# Patient Record
Sex: FEMALE | Race: BLACK OR AFRICAN AMERICAN | NOT HISPANIC OR LATINO
[De-identification: names, ages, dates, MRNs, and addresses within clinical notes are randomized per-mention and may not be internally consistent; named-entity substitution may affect disease eponyms.]

---

## 2022-04-09 ENCOUNTER — TRANSCRIPTION ENCOUNTER (OUTPATIENT)
Age: 72
End: 2022-04-09

## 2022-06-21 ENCOUNTER — OUTPATIENT (OUTPATIENT)
Dept: OUTPATIENT SERVICES | Facility: HOSPITAL | Age: 72
LOS: 1 days | End: 2022-06-21
Payer: COMMERCIAL

## 2022-06-21 DIAGNOSIS — R06.00 DYSPNEA, UNSPECIFIED: ICD-10-CM

## 2022-06-21 PROCEDURE — 93017 CV STRESS TEST TRACING ONLY: CPT

## 2022-06-21 PROCEDURE — 78452 HT MUSCLE IMAGE SPECT MULT: CPT | Mod: MH

## 2022-06-21 PROCEDURE — A9502: CPT

## 2022-12-12 PROBLEM — Z00.00 ENCOUNTER FOR PREVENTIVE HEALTH EXAMINATION: Status: ACTIVE | Noted: 2022-12-12

## 2022-12-13 ENCOUNTER — NON-APPOINTMENT (OUTPATIENT)
Age: 72
End: 2022-12-13

## 2022-12-13 ENCOUNTER — APPOINTMENT (OUTPATIENT)
Age: 72
End: 2022-12-13
Payer: COMMERCIAL

## 2022-12-13 ENCOUNTER — LABORATORY RESULT (OUTPATIENT)
Age: 72
End: 2022-12-13

## 2022-12-13 VITALS
SYSTOLIC BLOOD PRESSURE: 188 MMHG | BODY MASS INDEX: 38.8 KG/M2 | WEIGHT: 219 LBS | DIASTOLIC BLOOD PRESSURE: 91 MMHG | HEIGHT: 63 IN | HEART RATE: 84 BPM | TEMPERATURE: 98 F

## 2022-12-13 DIAGNOSIS — E66.9 OBESITY, UNSPECIFIED: ICD-10-CM

## 2022-12-13 DIAGNOSIS — Z87.891 PERSONAL HISTORY OF NICOTINE DEPENDENCE: ICD-10-CM

## 2022-12-13 DIAGNOSIS — Z86.16 PERSONAL HISTORY OF COVID-19: ICD-10-CM

## 2022-12-13 PROCEDURE — 36415 COLL VENOUS BLD VENIPUNCTURE: CPT

## 2022-12-13 PROCEDURE — 99245 OFF/OP CONSLTJ NEW/EST HI 55: CPT | Mod: 25

## 2022-12-13 PROCEDURE — 94060 EVALUATION OF WHEEZING: CPT

## 2022-12-13 PROCEDURE — 94664 DEMO&/EVAL PT USE INHALER: CPT | Mod: 59

## 2022-12-13 PROCEDURE — ZZZZZ: CPT

## 2022-12-13 PROCEDURE — 94726 PLETHYSMOGRAPHY LUNG VOLUMES: CPT

## 2022-12-13 PROCEDURE — 94729 DIFFUSING CAPACITY: CPT

## 2022-12-13 PROCEDURE — 96372 THER/PROPH/DIAG INJ SC/IM: CPT | Mod: 59

## 2022-12-13 RX ORDER — METOPROLOL SUCCINATE 100 MG/1
100 TABLET, EXTENDED RELEASE ORAL
Refills: 0 | Status: ACTIVE | COMMUNITY

## 2022-12-13 RX ORDER — GLYBURIDE 5 MG/1
5 TABLET ORAL
Refills: 0 | Status: ACTIVE | COMMUNITY

## 2022-12-13 RX ORDER — ALBUTEROL 90 MCG
90 AEROSOL (GRAM) INHALATION
Refills: 0 | Status: ACTIVE | COMMUNITY

## 2022-12-13 RX ORDER — METHYLPREDNISOLONE 40 MG/ML
40 INJECTION, POWDER, LYOPHILIZED, FOR SOLUTION INTRAMUSCULAR; INTRAVENOUS
Qty: 1 | Refills: 0 | Status: COMPLETED | OUTPATIENT
Start: 2022-12-13

## 2022-12-13 NOTE — REASON FOR VISIT
[Initial] : an initial visit [Shortness of Breath] : shortness of breath [Pulmonary Hypertension] : pulmonary hypertension

## 2022-12-13 NOTE — DISCUSSION/SUMMARY
Plan to continue IV lasix for next 24 to 48 hours.  Cardiac echo today to assess LV function.   [FreeTextEntry1] : ---Assessment plan----------The patient has been referred here for further opinion regarding pulmonary problem,\par 73yo with HTN, asthma since childhood, DM, CKD , obesity, pulm htn on echo w/ c/o RODRÍGUEZ\par \par 1) echo suggest pulm htn-----[  APPEARS TO DIASTOLIC DYSFUNCTION ]   referred to Dr Pepper Breaux for cadiac cath\par 2) Asthma, former smoker ?copd- will check CT chest noncontrast- start trelegy- instructed on proper use, albuterol rescue inhaler. s/p medrol 20mg IM in office- start low dose predniosne w/taper- advised to monitor blood sugars\par 3) labs drawn in our office today\par 4) CKD- follows nephrology Dr Barros\par 5) features of doug- snoring, fatigue, ooropharyngeal crowding ESS 3- get HST\par 6) labs drawn in our office today\par 7) UTD w/covid vac, refuses influenza vac\par 8) DM- on meds, wt loss, endo followup\par \par Thanks for allowing  me to participate  in the care of this patient.  Patient at this time  will follow  the above mentioned recommendations and return back for follow up visit. If you have any questions  I can be reached  at # [phone] (office).\par \par \par Kamryn Castellanos MD, FCCP \par Director, Pulmonary Hypertension Program \par [facility] \par Division of Pulmonary, Critical Care and Sleep Medicine \par  Professor of Medicine \par [employer]\par \par \par Jinny Calderón, ANP-C\par

## 2022-12-13 NOTE — HISTORY OF PRESENT ILLNESS
[Snoring] : snoring [TextBox_4] : This letter  is regarding your patient  who  attended pulmonary out patient office today.  I have reviewed  patient's  past history, social history, family history and medication list. I also  reviewed nurse practitioners/ and fellows  notes and assessment and agree with it.  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 Questionable history of Raynaud's phenomenon. No h/o significant weight loss in last few months. No history of liver dysfunction , collagen vascular disorder or chronic thromboembolic disease. I would classify the patient's dyspnea as WHO  FUNCTIONAL CLASS II--------\par \par ----Echo  date----2022 est pasp 64-72mmHG--\par ----Pft date------12/2022 normal lung volumes, decreased dlco---\par ----Ct scan date-\par --- cardiomegaly, bilat interstitial markening------\par ----Cath date------------\par \par  [ESS] : 3

## 2022-12-14 ENCOUNTER — LABORATORY RESULT (OUTPATIENT)
Age: 72
End: 2022-12-14

## 2022-12-14 ENCOUNTER — NON-APPOINTMENT (OUTPATIENT)
Age: 72
End: 2022-12-14

## 2022-12-14 DIAGNOSIS — N18.9 CHRONIC KIDNEY DISEASE, UNSPECIFIED: ICD-10-CM

## 2022-12-14 DIAGNOSIS — R06.02 SHORTNESS OF BREATH: ICD-10-CM

## 2022-12-14 LAB
A1AT SERPL-MCNC: 189 MG/DL
ALBUMIN SERPL ELPH-MCNC: 4.2 G/DL
ALP BLD-CCNC: 87 U/L
ALT SERPL-CCNC: 17 U/L
ANION GAP SERPL CALC-SCNC: 13 MMOL/L
APTT BLD: 31.6 SEC
AST SERPL-CCNC: 19 U/L
BASOPHILS # BLD AUTO: 0.01 K/UL
BASOPHILS NFR BLD AUTO: 0.2 %
BILIRUB SERPL-MCNC: 0.3 MG/DL
BUN SERPL-MCNC: 19 MG/DL
CALCIUM SERPL-MCNC: 9.1 MG/DL
CALCIUM SERPL-MCNC: 9.1 MG/DL
CHLORIDE SERPL-SCNC: 110 MMOL/L
CO2 SERPL-SCNC: 20 MMOL/L
COVID-19 NUCLEOCAPSID  GAM ANTIBODY INTERPRETATION: POSITIVE
COVID-19 SPIKE DOMAIN ANTIBODY INTERPRETATION: POSITIVE
CREAT SERPL-MCNC: 2.11 MG/DL
CRP SERPL-MCNC: 10 MG/L
DEPRECATED KAPPA LC FREE/LAMBDA SER: 2.55 RATIO
EGFR: 24 ML/MIN/1.73M2
ENA SCL70 IGG SER IA-ACNC: 0.3 AL
ENA SS-A AB SER IA-ACNC: >8 AL
ENA SS-B AB SER IA-ACNC: <0.2 AL
EOSINOPHIL # BLD AUTO: 0.12 K/UL
EOSINOPHIL NFR BLD AUTO: 2.6 %
ERYTHROCYTE [SEDIMENTATION RATE] IN BLOOD BY WESTERGREN METHOD: 100 MM/HR
ESTIMATED AVERAGE GLUCOSE: 143 MG/DL
FERRITIN SERPL-MCNC: 52 NG/ML
FOLATE RBC-MCNC: 981 NG/ML
GLUCOSE SERPL-MCNC: 58 MG/DL
HBA1C MFR BLD HPLC: 6.6 %
HCT VFR BLD CALC: 31.7 %
HCT VFR BLD CALC: 31.7 %
HCYS SERPL-MCNC: 15.3 UMOL/L
HGB BLD-MCNC: 10 G/DL
IGA SER QL IEP: 344 MG/DL
IGG SER QL IEP: 1855 MG/DL
IGM SER QL IEP: 37 MG/DL
IMM GRANULOCYTES NFR BLD AUTO: 0.2 %
INR PPP: 1.09 RATIO
KAPPA LC CSF-MCNC: 4.46 MG/DL
KAPPA LC SERPL-MCNC: 11.37 MG/DL
LYMPHOCYTES # BLD AUTO: 1.05 K/UL
LYMPHOCYTES NFR BLD AUTO: 22.6 %
MAN DIFF?: NORMAL
MCHC RBC-ENTMCNC: 27.7 PG
MCHC RBC-ENTMCNC: 31.5 GM/DL
MCV RBC AUTO: 87.8 FL
MONOCYTES # BLD AUTO: 0.27 K/UL
MONOCYTES NFR BLD AUTO: 5.8 %
NEUTROPHILS # BLD AUTO: 3.18 K/UL
NEUTROPHILS NFR BLD AUTO: 68.6 %
NT-PROBNP SERPL-MCNC: 2263 PG/ML
PARATHYROID HORMONE INTACT: 283 PG/ML
PHOSPHATE SERPL-MCNC: 3.8 MG/DL
PLATELET # BLD AUTO: 212 K/UL
POTASSIUM SERPL-SCNC: 3.9 MMOL/L
PROT SERPL-MCNC: 7.5 G/DL
PT BLD: 12.6 SEC
RBC # BLD: 3.61 M/UL
RBC # FLD: 16.7 %
RHEUMATOID FACT SER QL: <10 IU/ML
SARS-COV-2 AB SERPL IA-ACNC: >250 U/ML
SARS-COV-2 AB SERPL QL IA: 15.3 INDEX
SODIUM SERPL-SCNC: 144 MMOL/L
TSH SERPL-ACNC: 1.19 UIU/ML
URATE SERPL-MCNC: 5.3 MG/DL
VIT B12 SERPL-MCNC: 615 PG/ML
WBC # FLD AUTO: 4.64 K/UL

## 2022-12-15 LAB
ANA PAT FLD IF-IMP: ABNORMAL
ANACR T: ABNORMAL
CARDIOLIPIN AB SER IA-ACNC: NEGATIVE
TOTAL IGE SMQN RAST: 170 KU/L

## 2022-12-16 LAB
M TB IFN-G BLD-IMP: ABNORMAL
QUANTIFERON TB PLUS MITOGEN MINUS NIL: 0.15 IU/ML
QUANTIFERON TB PLUS NIL: 0.01 IU/ML
QUANTIFERON TB PLUS TB1 MINUS NIL: 0 IU/ML
QUANTIFERON TB PLUS TB2 MINUS NIL: 0 IU/ML

## 2022-12-17 ENCOUNTER — NON-APPOINTMENT (OUTPATIENT)
Age: 72
End: 2022-12-17

## 2022-12-19 LAB
ALBUMIN MFR SERPL ELPH: 51.9 %
ALBUMIN SERPL-MCNC: 3.9 G/DL
ALBUMIN/GLOB SERPL: 1.1 RATIO
ALPHA1 GLOB MFR SERPL ELPH: 5.2 %
ALPHA1 GLOB SERPL ELPH-MCNC: 0.4 G/DL
ALPHA2 GLOB MFR SERPL ELPH: 9.8 %
ALPHA2 GLOB SERPL ELPH-MCNC: 0.7 G/DL
B-GLOBULIN MFR SERPL ELPH: 11.3 %
B-GLOBULIN SERPL ELPH-MCNC: 0.9 G/DL
CCP AB SER IA-ACNC: <8 UNITS
GAMMA GLOB FLD ELPH-MCNC: 1.7 G/DL
GAMMA GLOB MFR SERPL ELPH: 21.8 %
INTERPRETATION SERPL IEP-IMP: NORMAL
PROT SERPL-MCNC: 7.6 G/DL
PROT SERPL-MCNC: 7.6 G/DL
RF+CCP IGG SER-IMP: NEGATIVE

## 2023-02-03 ENCOUNTER — APPOINTMENT (OUTPATIENT)
Dept: PULMONOLOGY | Facility: CLINIC | Age: 73
End: 2023-02-03

## 2023-02-28 ENCOUNTER — RX RENEWAL (OUTPATIENT)
Age: 73
End: 2023-02-28

## 2023-05-11 ENCOUNTER — NON-APPOINTMENT (OUTPATIENT)
Age: 73
End: 2023-05-11

## 2023-06-02 ENCOUNTER — APPOINTMENT (OUTPATIENT)
Dept: PULMONOLOGY | Facility: CLINIC | Age: 73
End: 2023-06-02
Payer: COMMERCIAL

## 2023-06-02 VITALS
SYSTOLIC BLOOD PRESSURE: 187 MMHG | RESPIRATION RATE: 16 BRPM | OXYGEN SATURATION: 92 % | WEIGHT: 227 LBS | BODY MASS INDEX: 40.22 KG/M2 | TEMPERATURE: 97.7 F | HEIGHT: 63 IN | DIASTOLIC BLOOD PRESSURE: 74 MMHG | HEART RATE: 87 BPM

## 2023-06-02 DIAGNOSIS — I27.20 PULMONARY HYPERTENSION, UNSPECIFIED: ICD-10-CM

## 2023-06-02 PROCEDURE — 94618 PULMONARY STRESS TESTING: CPT

## 2023-06-02 PROCEDURE — 99215 OFFICE O/P EST HI 40 MIN: CPT | Mod: 25

## 2023-06-02 PROCEDURE — ZZZZZ: CPT

## 2023-06-02 RX ORDER — PREDNISONE 5 MG/1
5 TABLET ORAL
Qty: 45 | Refills: 0 | Status: DISCONTINUED | COMMUNITY
Start: 2022-12-13 | End: 2023-06-02

## 2023-06-02 NOTE — DISCUSSION/SUMMARY
[FreeTextEntry1] : ---Assessment plan----------The patient has been referred here for further opinion regarding pulmonary problem,\par 73yo with HTN, asthma since childhood, DM, CKD , obesity, SECONDARY  pulm htn on echo w/ c/o RODRÍGUEZ  -\par \par \par 1) echo suggest pulm htn-----[  APPEARS TO DIASTOLIC DYSFUNCTION ]   follows cards Dr Barlow-referred to Dr Pepper Breaux for cadiac cath----she needs right heart cath to better define the severity and etiology of her pulmonary hypertension\par 2) Asthma, former smoker ?copd- --------will check CT chest noncontrast- continue  trelegy-and , albuterol rescue inhaler. \par 3) CKD- follows nephrology Dr Barros\par 4) features of doug- snoring, fatigue, oropharyngeal crowding ESS 3- get HST\par 5) DM- on meds, wt loss, endo followup\par 6) 6mwt --2023----evidence of desaturation------ but not amenable to supplemental oxygen---\par 7) f/u in 3m\par Thanks for allowing  me to participate  in the care of this patient.  Patient at this time  will follow  the above mentioned recommendations and return back for follow up visit. If you have any questions  I can be reached  at # [phone] (office).\par \par \par Kamryn Castellanos MD, [facility] \par \par

## 2023-06-02 NOTE — END OF VISIT
[Time Spent: ___ minutes] : I have spent [unfilled] minutes of time on the encounter. [FreeTextEntry3] : \par  I, Dr. Kamryn Castellanos  personally performed the evaluation and management (E/M) services for this established patient who presents today with (a) new problem(s)/exacerbation of (an) existing condition(s). That E/M includes conducting the clinically appropriate interval history &/or exam, assessing all new/exacerbated conditions, and establishing a new plan of care. Today, my RALF, Jinny Calderón NP, , was here to observe my evaluation and management service for this new problem/exacerbated condition and follow the plan of care established by me going forward.\par

## 2023-06-02 NOTE — HISTORY OF PRESENT ILLNESS
[Former] : former [Snoring] : snoring [TextBox_4] : This letter  is regarding your patient  who  attended pulmonary out patient office today.  I have reviewed  patient's  past history, social history, family history and medication list. I also  reviewed nurse practitioners/ and fellows  notes and assessment and agree with it.  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 Questionable history of Raynaud's phenomenon. No h/o significant weight loss in last few months. No history of liver dysfunction , collagen vascular disorder or chronic thromboembolic disease. I would classify the patient's dyspnea as WHO  FUNCTIONAL CLASS II--------\par \par ----Echo  date----2022 est pasp 64-72mmHG--\par ----Pft date------12/2022 normal lung volumes, decreased dlco---\par ----Ct scan date-\par --- cardiomegaly, bilat interstitial markening------\par ----Cath date------------\par \par 6/2023 reports noted improvement in breathing since starting trelegy\par did not tolerate prednisone- stopped d/t elevated blood sugars\par has not had ordered CT or sleep study done--- awaiting to do 6-minute walk test and exercise oximetry [ESS] : 3

## 2023-07-05 ENCOUNTER — APPOINTMENT (OUTPATIENT)
Dept: SLEEP CENTER | Facility: CLINIC | Age: 73
End: 2023-07-05
Payer: COMMERCIAL

## 2023-07-05 ENCOUNTER — NON-APPOINTMENT (OUTPATIENT)
Age: 73
End: 2023-07-05

## 2023-07-05 ENCOUNTER — APPOINTMENT (OUTPATIENT)
Dept: CARDIOLOGY | Facility: CLINIC | Age: 73
End: 2023-07-05
Payer: COMMERCIAL

## 2023-07-05 ENCOUNTER — OUTPATIENT (OUTPATIENT)
Dept: OUTPATIENT SERVICES | Facility: HOSPITAL | Age: 73
LOS: 1 days | End: 2023-07-05
Payer: COMMERCIAL

## 2023-07-05 VITALS
SYSTOLIC BLOOD PRESSURE: 170 MMHG | WEIGHT: 227 LBS | HEART RATE: 81 BPM | BODY MASS INDEX: 40.22 KG/M2 | DIASTOLIC BLOOD PRESSURE: 71 MMHG | HEIGHT: 63 IN | OXYGEN SATURATION: 94 %

## 2023-07-05 PROCEDURE — 99204 OFFICE O/P NEW MOD 45 MIN: CPT | Mod: 25

## 2023-07-05 PROCEDURE — 93000 ELECTROCARDIOGRAM COMPLETE: CPT

## 2023-07-05 PROCEDURE — 95800 SLP STDY UNATTENDED: CPT

## 2023-07-05 PROCEDURE — 95800 SLP STDY UNATTENDED: CPT | Mod: 26

## 2023-07-05 NOTE — PHYSICAL EXAM
[Well Developed] : well developed [Normal Conjunctiva] : normal conjunctiva [Clear Lung Fields] : clear lung fields [de-identified] : 2 out of 6 systolic murmur at the left sternal border

## 2023-07-05 NOTE — HISTORY OF PRESENT ILLNESS
[FreeTextEntry1] : 72-year-old female history of hypertension, dyspnea, pulmonary hypertension.  Patient is seen pulmonary hypertension specialist and is here for evaluation to have a cardiac cath done to determine severity and etiology of her pulmonary hypertension.

## 2023-07-05 NOTE — REVIEW OF SYSTEMS
[Fever] : no fever [Blurry Vision] : no blurred vision [SOB] : shortness of breath [Dyspnea on exertion] : dyspnea during exertion [Cough] : no cough

## 2023-07-05 NOTE — DISCUSSION/SUMMARY
[FreeTextEntry1] : Pulmonary hypertension JOAQUIM was explained the details of the procedure, indications, risk and benefits . Patient agrees to the procedure with clear understanding of the information provided.\par \par Hypertension, blood pressure is elevated, patient is on her first visit.  I recommend that we follow her blood pressure trends and eventually feels need escalation of therapy depending on the blood pressure trends. [EKG obtained to assist in diagnosis and management of assessed problem(s)] : EKG obtained to assist in diagnosis and management of assessed problem(s)

## 2023-07-12 DIAGNOSIS — G47.33 OBSTRUCTIVE SLEEP APNEA (ADULT) (PEDIATRIC): ICD-10-CM

## 2023-07-13 ENCOUNTER — APPOINTMENT (OUTPATIENT)
Dept: PULMONOLOGY | Facility: CLINIC | Age: 73
End: 2023-07-13
Payer: COMMERCIAL

## 2023-07-13 VITALS
OXYGEN SATURATION: 92 % | HEIGHT: 63 IN | TEMPERATURE: 97.9 F | SYSTOLIC BLOOD PRESSURE: 201 MMHG | RESPIRATION RATE: 16 BRPM | BODY MASS INDEX: 39.69 KG/M2 | HEART RATE: 78 BPM | WEIGHT: 224 LBS | DIASTOLIC BLOOD PRESSURE: 98 MMHG

## 2023-07-13 PROCEDURE — 99215 OFFICE O/P EST HI 40 MIN: CPT

## 2023-07-13 NOTE — DISCUSSION/SUMMARY
[FreeTextEntry1] : ---Assessment plan----------The patient has been referred here for further opinion regarding pulmonary problem,\par 73yo with HTN, asthma since childhood, DM, CKD , obesity, SECONDARY  pulm htn on echo w/ c/o RODRÍGUEZ  -\par \par \par 1) echo suggest pulm htn-----[  APPEARS TO DIASTOLIC DYSFUNCTION ]   follows cards Dr Barlow-referred to Dr Pepper Breaux for cadiac cath----she needs right heart cath to better define the severity and etiology of her pulmonary hypertension\par 2) Asthma, former smoker ?copd- --------will check CT chest noncontrast- continue  trelegy-and , albuterol rescue inhaler. --- Needs CT scan of the chest\par 3) CKD- follows nephrology Dr Barros\par 4) features of doug- snoring, fatigue, oropharyngeal crowding ESS 3-   hst shows doug----but not willing to try CPAP refer to Dr. Macarena Metcalf to explore further options about sleep apnea management\par 5) DM- on meds, wt loss, endo followup\par 6) 6mwt --2023----evidence of desaturation------ but not amenable to supplemental oxygen---\par 7) f/u in 3m\par Thanks for allowing  me to participate  in the care of this patient.  Patient at this time  will follow  the above mentioned recommendations and return back for follow up visit. If you have any questions  I can be reached  at # [phone] (office).\par \par \par Kamryn Castellanos MD, FCCP \par \par

## 2023-07-13 NOTE — HISTORY OF PRESENT ILLNESS
[Former] : former [Snoring] : snoring [TextBox_4] : This letter  is regarding your patient  who  attended pulmonary out patient office today.  I have reviewed  patient's  past history, social history, family history and medication list. I also  reviewed nurse practitioners/ and fellows  notes and assessment and agree with it.  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 Questionable history of Raynaud's phenomenon. No h/o significant weight loss in last few months. No history of liver dysfunction , collagen vascular disorder or chronic thromboembolic disease. I would classify the patient's dyspnea as WHO  FUNCTIONAL CLASS II--------\par \par ----Echo  date----2022 est pasp 64-72mmHG--\par ----Pft date------12/2022 normal lung volumes, decreased dlco---\par ----Ct scan date-pending\par --- cardiomegaly, bilat interstitial markening------\par ----Cath date------------ pending\par \par 6/2023 reports noted improvement in breathing since starting trelegy\par did not tolerate prednisone- stopped d/t elevated blood sugars\par has not had ordered CT or sleep study done--- awaiting to do 6-minute walk test and exercise oximetry\par \par july 2023------- using Trelegy inhaler says breathing is better-------hst shos doug----not willing to pursue CPAP advised to follow-up with sleep specialist Dr. Metcalf-------- patient has borderline elevated creatinine advised to follow-up with nephrology--------- \par Heart catheterization to better define pulmonary hypertension-- [ESS] : 3

## 2023-08-04 ENCOUNTER — TRANSCRIPTION ENCOUNTER (OUTPATIENT)
Age: 73
End: 2023-08-04

## 2023-08-04 ENCOUNTER — OUTPATIENT (OUTPATIENT)
Dept: OUTPATIENT SERVICES | Facility: HOSPITAL | Age: 73
LOS: 1 days | Discharge: HOME | End: 2023-08-04
Payer: COMMERCIAL

## 2023-08-04 DIAGNOSIS — I27.20 PULMONARY HYPERTENSION, UNSPECIFIED: ICD-10-CM

## 2023-08-04 LAB
ANION GAP SERPL CALC-SCNC: 13 MMOL/L — SIGNIFICANT CHANGE UP (ref 7–14)
BUN SERPL-MCNC: 16 MG/DL — SIGNIFICANT CHANGE UP (ref 7–23)
CALCIUM SERPL-MCNC: 8.6 MG/DL — SIGNIFICANT CHANGE UP (ref 8.4–10.5)
CHLORIDE SERPL-SCNC: 111 MMOL/L — HIGH (ref 98–107)
CO2 SERPL-SCNC: 19 MMOL/L — LOW (ref 22–31)
CREAT SERPL-MCNC: 2.29 MG/DL — HIGH (ref 0.5–1.3)
EGFR: 22 ML/MIN/1.73M2 — LOW
GLUCOSE SERPL-MCNC: 89 MG/DL — SIGNIFICANT CHANGE UP (ref 70–99)
HCT VFR BLD CALC: 32.6 % — LOW (ref 34.5–45)
HGB BLD-MCNC: 10.4 G/DL — LOW (ref 11.5–15.5)
MCHC RBC-ENTMCNC: 27.5 PG — SIGNIFICANT CHANGE UP (ref 27–34)
MCHC RBC-ENTMCNC: 31.9 GM/DL — LOW (ref 32–36)
MCV RBC AUTO: 86.2 FL — SIGNIFICANT CHANGE UP (ref 80–100)
NRBC # BLD: 0 /100 WBCS — SIGNIFICANT CHANGE UP (ref 0–0)
NRBC # FLD: 0 K/UL — SIGNIFICANT CHANGE UP (ref 0–0)
PLATELET # BLD AUTO: 181 K/UL — SIGNIFICANT CHANGE UP (ref 150–400)
POTASSIUM SERPL-MCNC: 4 MMOL/L — SIGNIFICANT CHANGE UP (ref 3.5–5.3)
POTASSIUM SERPL-SCNC: 4 MMOL/L — SIGNIFICANT CHANGE UP (ref 3.5–5.3)
RBC # BLD: 3.78 M/UL — LOW (ref 3.8–5.2)
RBC # FLD: 17.1 % — HIGH (ref 10.3–14.5)
SODIUM SERPL-SCNC: 143 MMOL/L — SIGNIFICANT CHANGE UP (ref 135–145)
WBC # BLD: 4.9 K/UL — SIGNIFICANT CHANGE UP (ref 3.8–10.5)
WBC # FLD AUTO: 4.9 K/UL — SIGNIFICANT CHANGE UP (ref 3.8–10.5)

## 2023-08-04 PROCEDURE — 93451 RIGHT HEART CATH: CPT | Mod: 26

## 2023-08-04 PROCEDURE — 93010 ELECTROCARDIOGRAM REPORT: CPT

## 2023-08-04 RX ORDER — NIFEDIPINE 30 MG
60 TABLET, EXTENDED RELEASE 24 HR ORAL ONCE
Refills: 0 | Status: COMPLETED | OUTPATIENT
Start: 2023-08-04 | End: 2023-08-04

## 2023-08-04 RX ORDER — ISOSORBIDE MONONITRATE 60 MG/1
30 TABLET, EXTENDED RELEASE ORAL ONCE
Refills: 0 | Status: COMPLETED | OUTPATIENT
Start: 2023-08-04 | End: 2023-08-04

## 2023-08-04 RX ORDER — SODIUM CHLORIDE 9 MG/ML
3 INJECTION INTRAMUSCULAR; INTRAVENOUS; SUBCUTANEOUS EVERY 8 HOURS
Refills: 0 | Status: DISCONTINUED | OUTPATIENT
Start: 2023-08-04 | End: 2023-08-18

## 2023-08-04 RX ORDER — GLYBURIDE 5 MG
4 TABLET ORAL
Refills: 0 | DISCHARGE

## 2023-08-04 RX ORDER — ISOSORBIDE MONONITRATE 60 MG/1
1 TABLET, EXTENDED RELEASE ORAL
Refills: 0 | DISCHARGE

## 2023-08-04 RX ORDER — NIFEDIPINE 30 MG
1 TABLET, EXTENDED RELEASE 24 HR ORAL
Qty: 90 | Refills: 0
Start: 2023-08-04 | End: 2023-11-01

## 2023-08-04 RX ORDER — LOSARTAN POTASSIUM 100 MG/1
100 TABLET, FILM COATED ORAL ONCE
Refills: 0 | Status: COMPLETED | OUTPATIENT
Start: 2023-08-04 | End: 2023-08-04

## 2023-08-04 RX ORDER — MONTELUKAST 4 MG/1
1 TABLET, CHEWABLE ORAL
Refills: 0 | DISCHARGE

## 2023-08-04 RX ORDER — FLUTICASONE FUROATE, UMECLIDINIUM BROMIDE AND VILANTEROL TRIFENATATE 200; 62.5; 25 UG/1; UG/1; UG/1
1 POWDER RESPIRATORY (INHALATION)
Refills: 0 | DISCHARGE

## 2023-08-04 RX ORDER — LOSARTAN POTASSIUM 100 MG/1
1 TABLET, FILM COATED ORAL
Refills: 0 | DISCHARGE

## 2023-08-04 RX ORDER — ALBUTEROL 90 UG/1
2 AEROSOL, METERED ORAL
Refills: 0 | DISCHARGE

## 2023-08-04 RX ADMIN — Medication 60 MILLIGRAM(S): at 12:43

## 2023-08-04 RX ADMIN — LOSARTAN POTASSIUM 100 MILLIGRAM(S): 100 TABLET, FILM COATED ORAL at 09:57

## 2023-08-04 RX ADMIN — ISOSORBIDE MONONITRATE 30 MILLIGRAM(S): 60 TABLET, EXTENDED RELEASE ORAL at 12:43

## 2023-08-04 NOTE — H&P CARDIOLOGY - HISTORY OF PRESENT ILLNESS
72 year old obese male former smoker with HTN, asthma, DM type 2, CKD, SARA (not on CPAP),  Covid (1/2022) with Echo that suggests pulmonary HTN. Reports exertional dyspnea since 5/2021 (relates to occurring after taking last covid vac 5/2021) that has improved with use of Trelegy inhaler   In light of pateints symptoms and abnormal noninvasive test findings the pateint is now referred to [facility] for a right heart catheterization to further assess hemodynamics and define pulmonary hypertension.     please see hard copy H&P in paper chart 7/13/23  PATIENT SEEN AND EXAMINED AND NO NEW CLINICAL CHANGES SINCE office visit 72 year old obese female former smoker with HTN, asthma, DM type 2, CKD, SARA (not on CPAP),  Covid (1/2022) with Echo that suggests pulmonary HTN. Reports exertional dyspnea since 5/2021 (relates to occurring after taking last covid vac 5/2021) that has improved with use of Trelegy inhaler and does not take her inhalers regularly   In light of patients symptoms and abnormal noninvasive test findings the patient is now referred to [facility] for a right heart catheterization to further assess hemodynamics and define pulmonary hypertension.   OF NOTE: Patient educated on strict compliance with medications as directed to improve her long term health. Patient explains she doesn't think she needs them all the time and that she doesn't believe she has SARA. I explained SARA testing and the possible relationship to untreated pulm HTN, however, patient resistant to information    please see hard copy H&P in paper chart 7/13/23  PATIENT SEEN AND EXAMINED AND NO NEW CLINICAL CHANGES SINCE office visit

## 2023-08-04 NOTE — H&P CARDIOLOGY - URINARY CATHETER
How Severe Are Your Spot(S)?: mild Have Your Spot(S) Been Treated In The Past?: has not been treated Hpi Title: Evaluation of Skin Lesions Location: Back no

## 2023-09-20 ENCOUNTER — NON-APPOINTMENT (OUTPATIENT)
Age: 73
End: 2023-09-20

## 2023-09-21 RX ORDER — FLUTICASONE FUROATE, UMECLIDINIUM BROMIDE AND VILANTEROL TRIFENATATE 100; 62.5; 25 UG/1; UG/1; UG/1
100-62.5-25 POWDER RESPIRATORY (INHALATION)
Qty: 60 | Refills: 3 | Status: ACTIVE | COMMUNITY
Start: 2023-09-21 | End: 1900-01-01

## 2023-10-17 ENCOUNTER — NON-APPOINTMENT (OUTPATIENT)
Age: 73
End: 2023-10-17

## 2023-10-27 ENCOUNTER — NON-APPOINTMENT (OUTPATIENT)
Age: 73
End: 2023-10-27

## 2023-10-27 ENCOUNTER — APPOINTMENT (OUTPATIENT)
Dept: PULMONOLOGY | Facility: CLINIC | Age: 73
End: 2023-10-27
Payer: COMMERCIAL

## 2023-10-27 VITALS
HEART RATE: 84 BPM | SYSTOLIC BLOOD PRESSURE: 177 MMHG | HEIGHT: 63 IN | WEIGHT: 220 LBS | OXYGEN SATURATION: 91 % | TEMPERATURE: 97.8 F | DIASTOLIC BLOOD PRESSURE: 78 MMHG | RESPIRATION RATE: 16 BRPM | BODY MASS INDEX: 38.98 KG/M2

## 2023-10-27 DIAGNOSIS — J45.909 UNSPECIFIED ASTHMA, UNCOMPLICATED: ICD-10-CM

## 2023-10-27 DIAGNOSIS — E11.9 TYPE 2 DIABETES MELLITUS W/OUT COMPLICATIONS: ICD-10-CM

## 2023-10-27 PROCEDURE — 99215 OFFICE O/P EST HI 40 MIN: CPT | Mod: 25

## 2023-10-27 PROCEDURE — 36415 COLL VENOUS BLD VENIPUNCTURE: CPT

## 2023-10-27 RX ORDER — NIFEDIPINE 60 MG/1
60 TABLET, EXTENDED RELEASE ORAL DAILY
Qty: 30 | Refills: 0 | Status: ACTIVE | COMMUNITY
Start: 2023-10-27 | End: 1900-01-01

## 2023-10-30 ENCOUNTER — TRANSCRIPTION ENCOUNTER (OUTPATIENT)
Age: 73
End: 2023-10-30

## 2023-10-30 LAB
ALBUMIN SERPL ELPH-MCNC: 3.7 G/DL
ALP BLD-CCNC: 68 U/L
ALT SERPL-CCNC: 10 U/L
ANION GAP SERPL CALC-SCNC: 10 MMOL/L
AST SERPL-CCNC: 20 U/L
BILIRUB SERPL-MCNC: 0.3 MG/DL
BUN SERPL-MCNC: 23 MG/DL
CALCIUM SERPL-MCNC: 8.7 MG/DL
CHLORIDE SERPL-SCNC: 111 MMOL/L
CO2 SERPL-SCNC: 22 MMOL/L
CREAT SERPL-MCNC: 4.04 MG/DL
EGFR: 11 ML/MIN/1.73M2
ESTIMATED AVERAGE GLUCOSE: 131 MG/DL
GLUCOSE SERPL-MCNC: 184 MG/DL
HBA1C MFR BLD HPLC: 6.2 %
HCT VFR BLD CALC: 30.6 %
HGB BLD-MCNC: 9.6 G/DL
MCHC RBC-ENTMCNC: 27.7 PG
MCHC RBC-ENTMCNC: 31.4 GM/DL
MCV RBC AUTO: 88.2 FL
NT-PROBNP SERPL-MCNC: 1877 PG/ML
PLATELET # BLD AUTO: 205 K/UL
POTASSIUM SERPL-SCNC: 3.8 MMOL/L
PROT SERPL-MCNC: 6.8 G/DL
RBC # BLD: 3.47 M/UL
RBC # FLD: 18.5 %
SODIUM SERPL-SCNC: 143 MMOL/L
TSH SERPL-ACNC: 1.08 UIU/ML
WBC # FLD AUTO: 4.33 K/UL

## 2024-08-16 ENCOUNTER — RESULT REVIEW (OUTPATIENT)
Age: 74
End: 2024-08-16
